# Patient Record
Sex: MALE | Race: WHITE | NOT HISPANIC OR LATINO | URBAN - METROPOLITAN AREA
[De-identification: names, ages, dates, MRNs, and addresses within clinical notes are randomized per-mention and may not be internally consistent; named-entity substitution may affect disease eponyms.]

---

## 2017-07-25 ENCOUNTER — OUTPATIENT (OUTPATIENT)
Dept: OUTPATIENT SERVICES | Facility: HOSPITAL | Age: 22
LOS: 1 days | Discharge: HOME | End: 2017-07-25

## 2017-07-25 DIAGNOSIS — G40.919 EPILEPSY, UNSPECIFIED, INTRACTABLE, WITHOUT STATUS EPILEPTICUS: ICD-10-CM

## 2018-08-01 ENCOUNTER — OUTPATIENT (OUTPATIENT)
Dept: OUTPATIENT SERVICES | Facility: HOSPITAL | Age: 23
LOS: 1 days | Discharge: HOME | End: 2018-08-01

## 2018-08-01 DIAGNOSIS — G40.919 EPILEPSY, UNSPECIFIED, INTRACTABLE, WITHOUT STATUS EPILEPTICUS: ICD-10-CM

## 2018-08-17 ENCOUNTER — OTHER (OUTPATIENT)
Age: 23
End: 2018-08-17

## 2018-08-17 PROBLEM — Z00.00 ENCOUNTER FOR PREVENTIVE HEALTH EXAMINATION: Status: ACTIVE | Noted: 2018-08-17

## 2018-08-20 ENCOUNTER — APPOINTMENT (OUTPATIENT)
Dept: OTOLARYNGOLOGY | Facility: CLINIC | Age: 23
End: 2018-08-20

## 2019-06-11 ENCOUNTER — RX CHANGE (OUTPATIENT)
Age: 24
End: 2019-06-11

## 2019-08-22 ENCOUNTER — RX RENEWAL (OUTPATIENT)
Age: 24
End: 2019-08-22

## 2019-09-07 ENCOUNTER — OUTPATIENT (OUTPATIENT)
Dept: OUTPATIENT SERVICES | Facility: HOSPITAL | Age: 24
LOS: 1 days | Discharge: HOME | End: 2019-09-07

## 2019-09-07 DIAGNOSIS — D64.9 ANEMIA, UNSPECIFIED: ICD-10-CM

## 2019-09-07 DIAGNOSIS — E78.00 PURE HYPERCHOLESTEROLEMIA, UNSPECIFIED: ICD-10-CM

## 2019-09-07 DIAGNOSIS — R79.89 OTHER SPECIFIED ABNORMAL FINDINGS OF BLOOD CHEMISTRY: ICD-10-CM

## 2019-09-07 DIAGNOSIS — E55.9 VITAMIN D DEFICIENCY, UNSPECIFIED: ICD-10-CM

## 2019-09-07 DIAGNOSIS — R76.12 NONSPECIFIC REACTION TO CELL MEDIATED IMMUNITY MEASUREMENT OF GAMMA INTERFERON ANTIGEN RESPONSE WITHOUT ACTIVE TUBERCULOSIS: ICD-10-CM

## 2019-09-07 DIAGNOSIS — B19.20 UNSPECIFIED VIRAL HEPATITIS C WITHOUT HEPATIC COMA: ICD-10-CM

## 2019-11-25 ENCOUNTER — APPOINTMENT (OUTPATIENT)
Dept: NEUROLOGY | Facility: CLINIC | Age: 24
End: 2019-11-25
Payer: MEDICARE

## 2019-11-25 VITALS
WEIGHT: 173 LBS | HEIGHT: 67 IN | SYSTOLIC BLOOD PRESSURE: 122 MMHG | BODY MASS INDEX: 27.15 KG/M2 | DIASTOLIC BLOOD PRESSURE: 76 MMHG | HEART RATE: 95 BPM

## 2019-11-25 DIAGNOSIS — Z86.59 PERSONAL HISTORY OF OTHER MENTAL AND BEHAVIORAL DISORDERS: ICD-10-CM

## 2019-11-25 PROCEDURE — 99213 OFFICE O/P EST LOW 20 MIN: CPT

## 2019-11-25 NOTE — PHYSICAL EXAM
[FreeTextEntry1] : He does move his extremities and appear symmetrically and mimics some movements that I show him.  He answers some questions appropriately, however then appears to repeat certain questions back to me after I was asked him.\par DTR trace in UE and absent in LE\par FTN NL\par gait normal\par

## 2019-11-25 NOTE — DISCUSSION/SUMMARY
[FreeTextEntry1] : Mr. Raya is a 24-year-old with autism and epilepsy currently with no seizures for prolonged period of time.  Behavioral episodes are not well controlled on abilify and occur daily at his program\par 1. Psychiatry evaluation\par 2. Increase depakote 375mg BID\par 3. Continue abilify\par 4. Repeat blood work before next visit in 6 months\par

## 2019-11-25 NOTE — REVIEW OF SYSTEMS
[Emotional Problems] : emotional problems [As Noted in HPI] : as noted in HPI [Negative] : Heme/Lymph

## 2019-12-26 ENCOUNTER — OTHER (OUTPATIENT)
Age: 24
End: 2019-12-26

## 2020-11-11 ENCOUNTER — APPOINTMENT (OUTPATIENT)
Dept: NEUROLOGY | Facility: CLINIC | Age: 25
End: 2020-11-11
Payer: MEDICARE

## 2020-11-11 PROCEDURE — 99213 OFFICE O/P EST LOW 20 MIN: CPT | Mod: 95

## 2020-11-11 NOTE — PHYSICAL EXAM
[FreeTextEntry1] : alert says hello\par Follows simple commands\par moving extremities symmetrically

## 2020-11-11 NOTE — DISCUSSION/SUMMARY
[FreeTextEntry1] : Mr. Raya is a 25-year-old with autism and epilepsy currently with no seizures for prolonged period of time. Behavioral episodes are well controlled on abilify 10mg BID\par 1. Continue depakote ER 375mg BID\par 2. Continue abilify 10mg BID\par 3. check his routine labs (done in Sept 2020)\par 4. f/u in 9-12 months

## 2020-11-11 NOTE — HISTORY OF PRESENT ILLNESS
[Home] : at home, [unfilled] , at the time of the visit. [Other Location: e.g. Home (Enter Location, City,State)___] : at [unfilled] [Mother] : mother [FreeTextEntry3] : Mother [FreeTextEntry1] : Mr. Raya is a 26yo man with autism and epilepsy last seen by me on 11/25/2019 for follow up of his behaviors and seizures.  Since then he has been doing well except for a few weeks in 3/2020 he has had no issues.  There have been no seizures and his behavior is good.  He is back in his program and is practicing social distancing and mask and hand hygiene

## 2022-07-06 ENCOUNTER — NON-APPOINTMENT (OUTPATIENT)
Age: 27
End: 2022-07-06

## 2023-03-20 NOTE — HISTORY OF PRESENT ILLNESS
[FreeTextEntry1] : Mr. Raya is a 24-year-old with history of epilepsy and autism here for follow-up of his seizures.  He has been seizure-free since 2011.  He is on Depakote 250 mg in the morning and 375 mg at night as well as Abilify 10 mg twice a day.  He has had no seizures.  He still gets occasional behavioral episodes.  His guardian was asking about increasing the abilify however I told her that I was not comfortable with that.  I  told her we could try increasing the depakote slightly to try and prevent further outbursts which he has mostly at his program\par No new medical issues\par No recent EEG\par Switched to a adult PMD\par  Ensure pt voiding. D/C. F/U PCP. Patient signed out to incoming physician.  All decisions regarding the progression of care will be made at their discretion.

## 2023-07-24 DIAGNOSIS — R05.9 COUGH, UNSPECIFIED: ICD-10-CM

## 2023-09-05 ENCOUNTER — APPOINTMENT (OUTPATIENT)
Dept: NEUROLOGY | Facility: CLINIC | Age: 28
End: 2023-09-05
Payer: MEDICARE

## 2023-09-05 ENCOUNTER — NON-APPOINTMENT (OUTPATIENT)
Age: 28
End: 2023-09-05

## 2023-09-05 VITALS
HEART RATE: 81 BPM | DIASTOLIC BLOOD PRESSURE: 58 MMHG | HEIGHT: 69 IN | SYSTOLIC BLOOD PRESSURE: 112 MMHG | BODY MASS INDEX: 25.18 KG/M2 | WEIGHT: 170 LBS

## 2023-09-05 DIAGNOSIS — R56.9 UNSPECIFIED CONVULSIONS: ICD-10-CM

## 2023-09-05 PROCEDURE — 99213 OFFICE O/P EST LOW 20 MIN: CPT

## 2023-09-05 RX ORDER — DIVALPROEX SODIUM 250 MG/1
250 TABLET, DELAYED RELEASE ORAL
Qty: 180 | Refills: 3 | Status: ACTIVE | COMMUNITY
Start: 2019-06-11 | End: 1900-01-01

## 2023-09-05 RX ORDER — AZITHROMYCIN 500 MG/1
500 TABLET, FILM COATED ORAL DAILY
Qty: 1 | Refills: 0 | Status: DISCONTINUED | COMMUNITY
Start: 2023-07-24 | End: 2023-09-05

## 2023-09-05 RX ORDER — AZITHROMYCIN 250 MG/1
250 TABLET, FILM COATED ORAL
Qty: 6 | Refills: 0 | Status: DISCONTINUED | COMMUNITY
Start: 2019-12-26 | End: 2023-09-05

## 2023-09-05 RX ORDER — AMOXICILLIN 500 MG/1
500 TABLET, FILM COATED ORAL
Qty: 20 | Refills: 0 | Status: DISCONTINUED | COMMUNITY
Start: 2020-03-26 | End: 2023-09-05

## 2023-09-05 RX ORDER — DIVALPROEX SODIUM 125 MG/1
125 TABLET, DELAYED RELEASE ORAL TWICE DAILY
Qty: 180 | Refills: 3 | Status: ACTIVE | COMMUNITY
Start: 2019-06-11 | End: 1900-01-01

## 2023-09-05 RX ORDER — AMOXICILLIN 500 MG/1
500 TABLET, FILM COATED ORAL 3 TIMES DAILY
Qty: 30 | Refills: 0 | Status: DISCONTINUED | COMMUNITY
Start: 2018-08-17 | End: 2023-09-05

## 2023-09-05 RX ORDER — ARIPIPRAZOLE 20 MG/1
20 TABLET ORAL TWICE DAILY
Qty: 90 | Refills: 3 | Status: ACTIVE | COMMUNITY
Start: 2019-08-22 | End: 1900-01-01

## 2023-09-05 RX ORDER — AMOXICILLIN 500 MG/1
500 TABLET, FILM COATED ORAL
Qty: 14 | Refills: 0 | Status: DISCONTINUED | COMMUNITY
Start: 2022-08-23 | End: 2023-09-05

## 2023-09-05 NOTE — HISTORY OF PRESENT ILLNESS
[FreeTextEntry1] : Mr. GUARDADO 27 year old male presents today to follow up on seizures. Has a hx of autism as well. Takes Depakote 375 mg BID and Abilify 10 mg BID.  Accompanied by mother and grandma. History provided by mother. Has had 2 seizures in his life- one in 2010 and 2011. Mother cannot describe his symptoms during a seizure as it has been so long. No recent seizures. No issues on Depakote, no episodes of staring into space, drooling, or incontinence. No side efx on medication.  Behavior has been okay, goes to day program at [facility].   March 2023: Valproic Acid level 47, CMP Liver/Sodium WNL

## 2023-09-05 NOTE — ASSESSMENT
[FreeTextEntry1] : Mr. GUARDADO 27 year old male presents today accompanied by mother and grandmother to discuss seizures and behavior problems. He has been taking Abilify 10 mg BID, and Depakote 375 mg BID for many years. Has not had any recent seizures, behavior issues have been controlled on Abilify. Most recent labs: March 2023: Valproic Acid level 47, CMP Liver/Sodium WNL.    Plan:  -C/w Depakote 375 mg BID -C/w Abilify 10 mg BID -Valproic Acid Serum, CMP -RTC 9-12 months

## 2023-09-05 NOTE — PHYSICAL EXAM
[FreeTextEntry1] : Exam limited due to hx of autism.  Mental status: Awake, alert and oriented x1.  Speech is not clear, speaks few words. Difficulty following commands.   Cranial nerves: Pupils equally round and reactive to light.  Motor:  Moves all extremities freely and symmetric.  strength 5/5.  Normal tone and bulk.  No abnormal movements.   Sensation: Intact to light touch.  Gait: Narrow and steady.

## 2023-10-26 DIAGNOSIS — K12.2 CELLULITIS AND ABSCESS OF MOUTH: ICD-10-CM

## 2023-10-26 RX ORDER — AMOXICILLIN AND CLAVULANATE POTASSIUM 500; 125 MG/1; MG/1
500-125 TABLET, FILM COATED ORAL
Qty: 20 | Refills: 0 | Status: ACTIVE | COMMUNITY
Start: 2023-10-26 | End: 1900-01-01

## 2024-02-13 RX ORDER — BENZONATATE 100 MG/1
100 CAPSULE ORAL 3 TIMES DAILY
Qty: 90 | Refills: 0 | Status: ACTIVE | COMMUNITY
Start: 2024-02-13 | End: 1900-01-01

## 2024-06-19 ENCOUNTER — NON-APPOINTMENT (OUTPATIENT)
Age: 29
End: 2024-06-19

## 2024-12-27 ENCOUNTER — APPOINTMENT (OUTPATIENT)
Dept: NEUROLOGY | Facility: CLINIC | Age: 29
End: 2024-12-27

## 2025-03-12 ENCOUNTER — APPOINTMENT (OUTPATIENT)
Dept: NEUROLOGY | Facility: CLINIC | Age: 30
End: 2025-03-12
Payer: MEDICARE

## 2025-03-12 VITALS
BODY MASS INDEX: 26.11 KG/M2 | OXYGEN SATURATION: 97 % | SYSTOLIC BLOOD PRESSURE: 123 MMHG | HEART RATE: 71 BPM | DIASTOLIC BLOOD PRESSURE: 77 MMHG | WEIGHT: 176.25 LBS | HEIGHT: 69 IN

## 2025-03-12 DIAGNOSIS — R56.9 UNSPECIFIED CONVULSIONS: ICD-10-CM

## 2025-03-12 PROCEDURE — 99213 OFFICE O/P EST LOW 20 MIN: CPT

## 2025-03-13 ENCOUNTER — NON-APPOINTMENT (OUTPATIENT)
Age: 30
End: 2025-03-13

## 2025-04-02 ENCOUNTER — NON-APPOINTMENT (OUTPATIENT)
Age: 30
End: 2025-04-02